# Patient Record
Sex: FEMALE | Race: WHITE | NOT HISPANIC OR LATINO | Employment: OTHER | URBAN - METROPOLITAN AREA
[De-identification: names, ages, dates, MRNs, and addresses within clinical notes are randomized per-mention and may not be internally consistent; named-entity substitution may affect disease eponyms.]

---

## 2023-04-04 ENCOUNTER — PATIENT OUTREACH (OUTPATIENT)
Dept: CARDIOLOGY | Facility: CLINIC | Age: 73
End: 2023-04-04
Payer: COMMERCIAL

## 2023-04-04 ENCOUNTER — HOSPITAL ENCOUNTER (OUTPATIENT)
Facility: CLINIC | Age: 73
End: 2023-04-04
Payer: COMMERCIAL

## 2023-04-04 DIAGNOSIS — I27.20 PULMONARY HYPERTENSION (H): Primary | ICD-10-CM

## 2023-04-04 DIAGNOSIS — I26.09 OTHER PULMONARY EMBOLISM WITH ACUTE COR PULMONALE, UNSPECIFIED CHRONICITY (H): ICD-10-CM

## 2023-04-04 DIAGNOSIS — R06.02 SOB (SHORTNESS OF BREATH): ICD-10-CM

## 2023-04-04 RX ORDER — POTASSIUM CHLORIDE 1500 MG/1
40 TABLET, EXTENDED RELEASE ORAL
Status: CANCELLED | OUTPATIENT
Start: 2023-04-04

## 2023-04-04 RX ORDER — POTASSIUM CHLORIDE 1500 MG/1
20 TABLET, EXTENDED RELEASE ORAL
Status: CANCELLED | OUTPATIENT
Start: 2023-04-04

## 2023-04-04 RX ORDER — LIDOCAINE 40 MG/G
CREAM TOPICAL
Status: CANCELLED | OUTPATIENT
Start: 2023-04-04

## 2023-04-04 RX ORDER — SODIUM CHLORIDE 9 MG/ML
INJECTION, SOLUTION INTRAVENOUS CONTINUOUS
Status: CANCELLED | OUTPATIENT
Start: 2023-04-04

## 2023-04-04 NOTE — PROGRESS NOTES
Patient was seen by Dr. Tejeda at [facility] on 4/4/2023.    Plan:  HRCT  Echo w/bubble  RHC w/vaso & Pulm Angio  Virtual follow up after all testing is completed    Additional Action needed:  Enter Orders    University scheduling needs:   Schedule testing      Plan was reviewed with the patient at time of the visit.  Patient verbalized understanding, agreed with plan and denied any further questions. Sofie Rosen RN on 4/4/2023 at 10:22 AM    -------------------------------------------  All orders placed, routed to [facility] for scheduling. Sofie Rosen RN on 4/4/2023 at 10:44 AM     detailed exam

## 2023-04-18 ENCOUNTER — TELEPHONE (OUTPATIENT)
Dept: CARDIOLOGY | Facility: CLINIC | Age: 73
End: 2023-04-18

## 2023-04-18 NOTE — TELEPHONE ENCOUNTER
Received call from daughter Chloe who said her sister had received a call from someone at the [facility] while she was on vacation telling them we don't accept patient's insurance of [employer] so she can't have testing done here.  patient has seen Dr. Tejeda at outreach clinic in [place] previously.    Clarified that since we are a accredited specialty clinic and there are only 2 in [place] ( & [place]), we do take her insurance but it is billed as an out of network provider so there may be a cost to her.  I recommended she call the insurance and clarify what that will be before we schedule.  She agreed.  --------------------------------------  Daughter called back and was told by insurance that if we can provide a letter of medical necessity of why it can only be done at [facility] and not [facility], it will likely be covered.  This would then mean that [facility] would have to perform the HR Chest CT & Echo w/bubble, but the Pulm Angiogram & RHC should still only be done at [facility]. Agreed we would write the letter for the CV Proc and reach out to our financial securing department to verify it's coverage before it is performed.  Patient verbalized understanding, agreed with plan and denied any further questions. Sofie Rosen RN on 4/18/2023 at 2:40 PM    ----------------------------------------  Sent staff msg to Dr. Tejeda updating him to this development. Sofie Rosen RN on 4/18/2023 at 2:40 PM  --------------------------------------  Dr. Tejeda is OK with patient having Echo & Chest CT at [facility].  I sent staff msg to Rose Mary asking her to compose a letter of medical Necessity for patient's Pulmonary Angiogram & RHC at the [facility]. Sofie Rosen RN on 4/25/2023 at 3:41 PM  -----------------------------------------  Faxed orders for Echo & Chest CT to [facility] and sent e-mail alerting RN's they were sent. Sofie Rosen RN on 4/25/2023 at 3:48 PM

## 2023-04-27 ENCOUNTER — TELEPHONE (OUTPATIENT)
Dept: CARDIOLOGY | Facility: CLINIC | Age: 73
End: 2023-04-27
Payer: COMMERCIAL

## 2023-04-27 NOTE — LETTER
2023      TO: Azul Montiel  [address] [place] [id number]       To Whom It May Concern,    I am writing in reference to a mutual patient, Azul Montiel, :  1950. This letter is to request In-Network authorization and payment for Ms. Montiel to receive appropriate testing, management, and monitoring of her pulmonary hypertension at the [facility].    Azul Montiel's most recent right heart catheterization on 1/3/23 showed severe pulmonary hypertension with midly elevated left-sided filling pressure.  Due to the severity and complexity of Azul's pulmonary arterial hypertension, Pedro Parkinson M.D., has requested that she see Dr. Tejeda at the [facility] for continuatin of care and managin her pulmonary hypertension.         She underwent a very thorough [facility] for the her exertional shortness of breath. Her echocardiogram showed severely dilated right ventricle with moderate to severely reduced right ventricular function. Her interventricular septum was flattened consistent with right ventricular pressure and volume overload. Her estimated right ventricular systolic pressure was significantly elevated at 95 mmHg. Her left ventricle was small and underfilled. She had no other valvular abnormalities. Subsequently she had a right heart catheterization that showed severe pulmonary hypertension with mildly elevated left-sided filling pressure. Her cardiac output was preserved. Her PVR was approximately 4.5 Wood units. Her coronary angiogram showed no significant epicardial coronary artery disease. She had a 50% lesion in the mid circumflex. She had a VQ scan that was intermediate probability with a single mismatch perfusion defect in the left upper lobe. She had a CT pulmonary angiogram that showed no evidence of acute or chronic thromboembolic pulmonary disease. She is now referred to us for further  evaluation and management of her pulmonary hypertension.    Because of Ms. Montiel's complex medical history and need for specialized evaluation for her severe pulmonary hypertension, I would ask that [employer] allow Ms. Montiel to be closely evaluated and monitored at the [facility]. I would request that you allow Ms. Montiel to continue her care at the [facility] with unlimited office visits, diagnoses, treatment and complex testing (such as pulmonary angiogram, right heart catheterization, echocardiogram, cardiac MRI, 6 minute walk test, pulmonary function testing, labs, cardiac stress test, etc.), and that you cover these necessary medical expenses at an in-network rate of reimbursement (100%).     As the [facility] is one of only two accredited Pulmonary Hypertension Care Centers in [place], it would not be appropriate for Ms. Montiel to be evaluated and treated by an outside, non-accredited facility.    If you have further questions or concerns, please contact my Clinical , Rose Mary Storey CMA at [phone].    Graciously,    ?    Nikhil Tejeda MD   of Medicine  Pulmonary Hypertension  [facility]  Cardiovascular Division

## 2023-04-27 NOTE — LETTER
Cardiology/Pulmonary Hypertension  [address]  [place] [address]  Phone [phone]  Fax [phone]  May 3, 2023    [employer]       RE:   Azul Montiel [address] [address]        :  1950  [place]  [address]      ID:   [id number]   FAX:  [phone]    To Whom It May Concern,    I am writing in reference to a mutual patient, Azul Montiel, :  1950. This letter is to request In-Network authorization and payment for Ms. Montiel to receive appropriate testing, management, and monitoring of her pulmonary hypertension at the [facility].    Ms. Montiel was referred to [facility] by Dr. Pedro Parkinson, Cardiology/Interventional Cardiology at .  Ms. Montiel underwent extensive testing for her exertional shortness of breath.   Ms. Montiel's most recent right heart catheterization on 1/3/23 showed severe pulmonary hypertension with midly elevated left-sided filling pressure.  Due to the severity and complexity of Azul's pulmonary arterial hypertension, Dr. Parkinson has requested that she see Dr. Tejeda at the [facility] for continuation of care and management of her pulmonary hypertension, Who Group 3, NYHA Functional Class 3.       Because of Ms. Montiel's complex medical history and need for specialized evaluation for her severe pulmonary hypertension, I would ask that [facility] allow Ms. Montiel to be closely evaluated and monitored at the [facility]. I would request that you allow Ms. Montiel to continue her care at the [facility] with unlimited office visits, diagnoses, treatment and complex testing (such as pulmonary angiogram, right heart catheterization, echocardiogram, cardiac MRI, 6 minute walk test, pulmonary function testing, labs, cardiac stress test, etc.), and that you cover these necessary medical expenses at an in-network rate of reimbursement (100%).      As the [facility] is one of only two accredited Pulmonary Hypertension Care Centers in [place], it would not be appropriate for Ms. Montiel to be evaluated and treated by an outside, non-accredited facility.    If you have further questions or concerns, please contact my Clinical , Rose Mary Storey CMA at [phone].    Graciously,    ?Nikhil Tejeda MD   of Medicine, Pulmonary Hypertension  [facility], Cardiovascular Division

## 2023-04-27 NOTE — LETTER
May 3, 2023    [facility]       RE:   Azul MontielMontserrat [address] [address]       :  1950  [place]  [address]      ID:   [id number]     FAX:  [phone]    To Whom It May Concern,    I am writing in reference to a mutual patient, Azul Montiel, :  1950. This letter is to request In-Network authorization and payment for Ms. Montiel to receive appropriate testing, management, and monitoring of her pulmonary hypertension at the [facility].    Ms. Montiel was referred to [facility] by Dr. Pedro Parkinson, Cardiology/Interventional Cardiology at [facility].  Ms. Montiel underwent extensive testing for her exertional shortness of breath.   Ms. Montiel's most recent right heart catheterization on 1/3/23 showed severe pulmonary hypertension with midly elevated left-sided filling pressure.  Due to the severity and complexity of Azul's pulmonary arterial hypertension, Dr. Parkinson has requested that she see Dr. Tejeda at the [facility] for continuation of care and management of her pulmonary hypertension, Who Group 3, NYHA Functional Class 3.       Because of Ms. Montiel's complex medical history and need for specialized evaluation for her severe pulmonary hypertension, I would ask that [facility] allow Ms. Montiel to be closely evaluated and monitored at the [facility]. I would request that you allow Ms. Montiel to continue her care at the [facility] with unlimited office visits, diagnoses, treatment and complex testing (such as pulmonary angiogram, right heart catheterization, echocardiogram, cardiac MRI, 6 minute walk test, pulmonary function testing, labs, cardiac stress test, etc.), and that you cover these necessary medical expenses at an in-network rate of reimbursement (100%).     As the [facility] is one of only two accredited Pulmonary Hypertension Care Centers in [place], it would not  be appropriate for Ms. Montiel to be evaluated and treated by an outside, non-accredited facility.    If you have further questions or concerns, please contact my Clinical , Rose Mary Storey CMA at [phone].    Graciously,    ?    Nikhil Tejeda MD   of Medicine  Pulmonary Hypertension  [facility]  Cardiovascular Division

## 2023-04-27 NOTE — TELEPHONE ENCOUNTER
----- Message from Sofie Rosen RN sent at 4/25/2023  3:38 PM CDT -----  Regarding: Letter  Rose Mary,    Patient needs a (Medical necessity) letter written to her insurance so they will cover a Pulmonary Angiogram & RHC at the [facility] as we are out of network.      Thanks,  Sofie

## 2023-05-03 NOTE — TELEPHONE ENCOUNTER
"5/3/2023  @ 9:37 AM - Sent msg to HEAVENLY Sullivan re: updating WHO group, Abril; \"Oh Carrizales, For Ltr of Med Nec:   Pt's Care Team is not updated, and I will need name of her referring PCP.  Also,  has she had any testing done that is necessitating the PH referral that I can reference in the letter to [facility]?  I can't see any notes from her visit on 4/4/23, and no recs from [facility]/scanned to her chart/from CareEverywhere.  Were any records requested prior to her appt on 4/4/23?\"      940 am -  LRR called [employer] @ [phone] - for address or fax # to send Letter of Medical Necessity/ Request for Out of Network coverage.  Kimberlyn/Rep -   Ref #:  [id number]- stated that there is no fax #, that the request needs to be completed via [email or web address]. \     LRR stated that we do not have access to [employer] and req to speak to supervisor.  942 am -  Spoke to supervisor:  Butch.   Send to : [employer], [address] [address], [place] [address].  FAX:  [phone].         Rose Mary GALINDO CMA- Prior Auths  Cardiology/Pulmonary Hypertension     "

## 2023-05-30 ENCOUNTER — TELEPHONE (OUTPATIENT)
Dept: CARDIOLOGY | Facility: CLINIC | Age: 73
End: 2023-05-30
Payer: COMMERCIAL

## 2023-05-30 NOTE — TELEPHONE ENCOUNTER
Daughter called to say that they haven't heard anything from anyone for about a month.  Her mother completed the 2 tests at [facility] like we asked, but they haven't heard from patient's insurance about approving the CV procedure we were trying to get  Done after writing them a letter.    After looking though the chart, the Insurance letter was written 5/3, but it doesn't appear to have been sent to patient's insurance.  A follow up appt was overlooked because we were waiting for the procedure to be completed before having one.  Advised daughter I will look at testing in [facility] and follow up with our financial securing to see how to go about sending this letter as part of an appeal then zack her back. Daughter verbalized understanding, agreed with plan and denied any further questions. Sofie Rosen RN on 5/30/2023 at 10:45 AM  ---------------------------------  Sent staff msg to Indu Dean in Financial Securing to see if she can help re-submit CV procedure approval with letter.  Sofie Rosen RN on 5/30/2023 at 11:00 AM    Indu said she couldn't see anything on her end, so recommended I call [employer] to see if they could help.    Billing advised I should just send the letter directly to patient's insurance company as she doesn't see any denial on record.  ----------------------------------  Faxed Letter of Medical Necessity directly to [facility] [employer] via Fax.  Sofie Rosen RN on 5/30/2023 at 3:28 PM     Detail Level: Detailed Otc Regimen: Apply aquaphor multiple times daily

## 2023-05-30 NOTE — TELEPHONE ENCOUNTER
Reviewed Echo w/bbble & Chest CT performed at [facility].Sofie Rosen RN on 5/30/2023 at 3:36 PM  -------------------------------  Called daughter & updated her that I reviewed the Echo & Chest CT as well as faxed the letter to [facility] directly.  Asked that they call me when they hear something from ins company. Daughter verbalized understanding, agreed with plan and denied any further questions. Sofie Rosen RN on 5/30/2023 at 3:45 PM

## 2023-06-12 ENCOUNTER — TELEPHONE (OUTPATIENT)
Dept: CARDIOLOGY | Facility: CLINIC | Age: 73
End: 2023-06-12
Payer: COMMERCIAL

## 2023-06-12 DIAGNOSIS — R06.02 SOB (SHORTNESS OF BREATH): ICD-10-CM

## 2023-06-12 DIAGNOSIS — I27.20 PULMONARY HYPERTENSION (H): Primary | ICD-10-CM

## 2023-06-12 RX ORDER — TREPROSTINIL 16-32-48
KIT INHALATION
Qty: 120 EACH | Refills: 11 | COMMUNITY
Start: 2023-06-12 | End: 2023-07-10

## 2023-06-12 RX ORDER — ROSUVASTATIN CALCIUM 20 MG/1
1 TABLET, COATED ORAL DAILY
COMMUNITY
Start: 2023-01-03

## 2023-06-12 RX ORDER — CITALOPRAM HYDROBROMIDE 40 MG/1
1 TABLET ORAL DAILY
COMMUNITY
Start: 2023-02-21

## 2023-06-12 RX ORDER — IPRATROPIUM BROMIDE AND ALBUTEROL SULFATE 2.5; .5 MG/3ML; MG/3ML
3 SOLUTION RESPIRATORY (INHALATION)
COMMUNITY
Start: 2022-08-12

## 2023-06-12 RX ORDER — PHENYTOIN SODIUM 100 MG/1
CAPSULE, EXTENDED RELEASE ORAL
COMMUNITY
Start: 2023-01-17

## 2023-06-12 RX ORDER — FUROSEMIDE 20 MG
1 TABLET ORAL DAILY
COMMUNITY
Start: 2022-12-13

## 2023-06-12 RX ORDER — GLIMEPIRIDE 4 MG/1
4 TABLET ORAL
COMMUNITY
Start: 2023-03-17

## 2023-06-12 RX ORDER — RAMIPRIL 2.5 MG/1
CAPSULE ORAL
COMMUNITY
Start: 2023-04-17

## 2023-06-12 RX ORDER — BUPROPION HYDROCHLORIDE 300 MG/1
300 TABLET ORAL
COMMUNITY
Start: 2022-10-16

## 2023-06-12 NOTE — TELEPHONE ENCOUNTER
"6/14/2023  @ 4:57 PM -      1)  Spoke to daughter Chloe who stated that as of 6/13/23, [employer] is stating that they had not received the appeal/in-network authorization request.   JOSE refaxed to [employer] @  [phone] requesting expedited response to our fax #.     2) Daughter Chloe was wondering about scheduling Azul's F/U appt after she starts Tyvaso DPI  - she was told by \"scheduling\" that Dr Tejeda did not have any openings until October.   JOSE told her that it is possible for the F/U appt to be virtual but would check with  Nursing/Sofie to verify.   Hopefully by then [employer]ori will have address the in-network authorization request.      3)  Chloe will cxl pt' s appt with her Cardiologist in [place], [place] that is scheduled for 7/3/23 as there is nothing new to address with that physician.   Rose Mary GALINDO [facility]- Prior Auths  Cardiology/Pulmonary Hypertension       Called daughter Chloe who has not heard from Insurance about the procedure, but did speak with Dr. Tejeda last week and a plan was made to start Tyvaso & follow up in July @ [facility].  Verbalized understanding. Sofie Rosen RN on 6/12/2023 at 2:59 PM      ----- Message from Sofie Rosen RN sent at 5/30/2023  3:40 PM CDT -----  Regarding: FW: Appeal  Faxed letter of medical Necessity 5/30/23 for Pulm Angio.  Reply??  ----- Message -----  From: Indu Daen  Sent: 5/30/2023  12:07 PM CDT  To: Sofie Rosen RN  Subject: RE: Appeal                                       Hello,    It looks like the patient has [employer] coverage which we do not have a contract with so if the patient can have this done at a [facility] facility she should have her care there. I tried to look and see if I could find any notes from anyone working on an appeal but I do not see anything in her chart or at least what I can access I don't see anything. We do not have contact info for the appeals department. Maybe try calling the billing department at [phone] and " ask if you could speak to someone on the appeals team. I would think that they would be able to get you into contact with someone.    Thank you,  Indu    ----- Message -----  From: Sofie Rosen RN  Sent: 5/30/2023  10:52 AM CDT  To: Indu Dean  Subject: Appeal                                           Hi Indu,    The above patient was supposed to have a Pulmonary Angiogram and RHC completed at the [place] [facility] a couple months ago.  It was denied so we wrote a letter of medical necessity but I don't think anyone has sent it to her insurance yet to try and get it approved.  Are you the correct person to help me with this?    I appreciate any help you can give.    Thanks,  Sofie

## 2023-06-14 ENCOUNTER — TELEPHONE (OUTPATIENT)
Dept: CARDIOLOGY | Facility: CLINIC | Age: 73
End: 2023-06-14
Payer: COMMERCIAL

## 2023-06-14 DIAGNOSIS — I27.20 PULMONARY HYPERTENSION (H): ICD-10-CM

## 2023-06-14 DIAGNOSIS — R06.02 SOB (SHORTNESS OF BREATH): ICD-10-CM

## 2023-06-14 NOTE — TELEPHONE ENCOUNTER
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 Follow up in a month after starting tyvaso in [place]. I have updated my note in  for her.     Thank you    TT      ----- Message -----  From: Sofie Rosen RN  Sent: 6/13/2023   1:54 PM CDT  To: Nikhil Tejeda MD  Subject: Plan                                             Hi Dr. Tejeda,    I saw a note come through somewhere about this patient and her starting Tyvaso DPI, but I can't find the original message and what the rest of the plan is.  Can you remind me please.    Sofie Moe

## 2023-06-14 NOTE — TELEPHONE ENCOUNTER
6/14/2023  @ 4:10 PM -  Tyvaso DPI -titr kit - new start - APPROVED -  PA Case: [id number], Status: Approved, Coverage Starts on: 1/1/2023 12:00:00 AM, Coverage Ends on: 12/31/2023 12:00:00 AM. Questions? Contact [phone].    Prior Authorization Approval  Medication: TYVASO DPI TITRATION KIT 16 & 32 & 48 MCG IN POWD  Authorization Effective Date: 1/1/2023  Authorization Expiration Date: 12/31/2023  Approved Dose/Quantity: 252/28  Reference #: Case: [id number]   Insurance Company: [employer] [phone] Fax [phone]  Expected CoPay:       CoPay Card Available:      Financial Assistance Needed: *  Which Pharmacy is filling the prescription: [facility] [address]  Pharmacy Notified:  See below  Patient Notified:   Called pt @ [phone] (home) [phone] (work) - no answer, LVM that PA was approved, enrollment sent to [employer] and copay assist submitted to [employer]    Updated Snapshot, added to PA Calendar; enrollment and PA approval info and note that Req for Support was faxed to [employer] included on cover sheet and faxed to : [employer]:  [phone]    Rose Mary GALINDO CMA- Prior Auths  Cardiology/Pulmonary Hypertension

## 2023-06-14 NOTE — TELEPHONE ENCOUNTER
"7/12/2023  @ 5:10 PM -      1)  Chloe daughter calling that [employer] insurance is requiring provider's office to call and talk to clinical dept at [facility] to verify that pt has the disease.  [facility] staff stated they do not have a pending PA on file.   [A PA was submitted to [employer] on 6/28/23 - no response]  Chloe is wanting to know if the office visit/virtual scheduled for 7/27/23 will be covered by [employer].       LRR let Chloe know:    Per [facility] [employer] Payor policy, pt would be covered due to exception : \" Pulmonary Hypertension Program: Patients in this program at the [facility] are allowed to be scheduled/seen due to limited availability of these services in [place]. Make patient aware that [facility] is a non-participating provider to [facility]\"       LRR gave Chloe phone # for Cost of Care:  [phone] and Financial Securing to see if they have information on whether next visit could be charged as in-network instead of OON.  She will call [facility] back if there is anything needed.       2)  Chloe reported that pt rcvd Tyvaso DPI on Wednesday, 7/5/23.  They waited 2-3 days for [facility] nurse to call and no one called.  Sofie called pt, who reported she has not taken it yet.   Sofie yesterday reached out via email why Nurse has not reached out.   Chloe also called [facility] again,  was on hold for 40 minutes, escalated to manager, manager was not able to answer question either.    [facility] sent message to Saundra/[facility] follow up and give status update.        Routed to  Nursing Pool.       Rose Mary GALINDO CMA- Prior Auths  Cardiology/Pulmonary Hypertension     6/20/2023  @ 11:16 AM -  Spoke to daughter, Chloe and let her know I would send the Req for Support via [employer] for Azul to sign and send back and I will fax to [employer] on her behalf.   Chloe verbalized understanding.   Assist has faxed back confirmation that they received Provider portion 6/14/23 and will reach out to patient for signature.       Chloe also mentioned appeal to [facility] for " OON coverage for pt at [facility].  Appeal letter was refaxed on 6/14/23 and LRR  Has not seen a response as of today.    Asked Chloe to call [employer] and check if they are received appeal and if there is a decision made.   Chloe EASON.    Rose Mary GALINDO CMA- Prior Auths  Cardiology/Pulmonary Hypertension       6/14/2023  @ 3:20 PM -  Enrollment - enrollment and PA approval info and note that Req for Support was faxed to [employer]asia included on cover sheet and faxed to : [facility]:  [phone]              6/21/2023  @ 11:15 AM -  Rcvd from pt via WHATT 6/20/23 and faxed to Assist via [email or web address]. lrr  Rose Mary GALINDO CMA- Prior Auths  Cardiology/Pulmonary Hypertension         Rose Mary GALINDO CMA- Prior Auths  Cardiology/Pulmonary Hypertension

## 2023-06-14 NOTE — TELEPHONE ENCOUNTER
6/14/2023  @ 3:21 PM -  Tyvaso DPI - titra kit - new start     PA Initiation  Medication: Tyvaso DPI - titra kit - new start   Insurance Company: [employer] - Phone [phone] Fax [phone]  Pharmacy Filling the Rx: [facility] [place] - [address] [place] [address]  Filling Pharmacy Phone:    Filling Pharmacy Fax:    Start Date: 6/14/2023  via [facility], key [id number], w/ RHC dated : 1/3/23; Dx Code: I27.2 OOP to ILD; Addtl responses:  Azul Montiel's wedge pressure was elevated at the time of the right heart catheterization to 18 as she was in a state of hypervolemia, causing a decrease in her PVR.    RHC (01/2023)  [facility]  RA 6  RV   PA 73/26 (43)  PCWP 18  LVEDP 19 mm Hg  PA% 63  AO%   CO/CI 5.8/2.6  PVR 4.3 RODRIGUEZ       ----------------------------  Insurance: [employer] MEDICARE/DST PHARM SOL DIRECT  BIN: [id number]  PCN: [id number]  GRP#: *  ID#: [id number]       Rose Mary GALINDO CMA- Prior Auths  Cardiology/Pulmonary Hypertension

## 2023-06-14 NOTE — TELEPHONE ENCOUNTER
Sent message to Rose Mary to begin PA for Tyvaso DPI. Sofie Rosen RN on 6/14/2023 at 10:29 AM    Sent e-mail to [place] RN's with update of plan and requested patient be scheduled for follow up appt in July/Aug with labs & 6mwt prior.  I placed orders directly into [facility] chart. Sofie Rosen RN on 6/14/2023 at 10:51 AM    ----- Message from Nikhil Tejeda MD sent at 6/13/2023  9:50 PM CDT -----  Regarding: RE: Plan  We are starting her on inhaled tyvaso DPI for PH-ILD. Follow up in a month after starting tyvaso in [place]. I have updated my note in [facility] for her.     Thank you    TT      ----- Message -----  From: Sofie Rosen RN  Sent: 6/13/2023   1:54 PM CDT  To: Nikhil Tejeda MD  Subject: Plan                                             Hi Dr. Tejeda,    I saw a note come through somewhere about this patient and her starting Tyvaso DPI, but I can't find the original message and what the rest of the plan is.  Can you remind me please.    Thanks,  Sofie

## 2023-06-20 ENCOUNTER — TELEPHONE (OUTPATIENT)
Dept: CARDIOLOGY | Facility: CLINIC | Age: 73
End: 2023-06-20
Payer: COMMERCIAL

## 2023-06-20 NOTE — TELEPHONE ENCOUNTER
6/27/2023  @ 12:39 PM -  Rcvd fax back from [facility] requesting additional information and for request to be submitted via [employer].   LRR forwarded form to Indu Dean/ Securing requesting she complete and submit as I do not have the CPT/ procedure codes needed on the form.             Rose Mary GALINDO CMA- Prior Auths  Cardiology/Pulmonary Hypertension       6/20/2023  @ 11:16 AM -  Spoke to daughter, Chloe and let her know I would send the Req for Support via [employer] for Azul to sign and send back and I will fax to Assist on her behalf.   Chloe verbalized understanding.   Assist has faxed back confirmation that they received Provider portion 6/14/23 and will reach out to patient for signature.       Chloe also mentioned appeal to [employer] for OON coverage for pt at [facility].  Appeal letter was refaxed on 6/14/23 and LRR  Has not seen a response as of today.    Asked Chloe to call [employer] and check if they are received appeal and if there is a decision made.   Chloe EASON.    Rose Mary GALINDO CMA- Prior [facility]  Cardiology/Pulmonary Hypertension

## 2023-06-23 NOTE — TELEPHONE ENCOUNTER
Received call from [facility] [employer] [facility] stating she received our letter and wants to confirm what we are looking for with it.    Advised her that we need 2 Authorizations;  1) Office visit  2) Pulmonary Angiogram    She will forward to their clinical team so they know we are looking for two authorizations.  Agreed with plan. Sofie Rosen RN on 6/23/2023 at 9:02 AM

## 2023-06-26 NOTE — TELEPHONE ENCOUNTER
"6/26/2023  @ 2:04 PM -  Per status report dated : 6/23/23 from [facility]:  \"RX ELECTRONICALLY SIGNED, NOT VALID. PLEASE HAVE PRESCRIBER SIGN AND DATE THE ATTACHED PRESCRIPTION AND RETURN VIA -[phone]\"  Rose Mary GALINDO CMA- Prior Auths  Cardiology/Pulmonary Hypertension     "

## 2023-06-28 NOTE — TELEPHONE ENCOUNTER
"7/12/2023  @ 5:10 PM -       1)  Chloe daughter calling that [employer] insurance is requiring provider's office to call and talk to clinical dept at [facility] to verify that pt has the disease.  [facility] staff stated they do not have a pending PA on file.   [A PA was submitted to [employer] on 6/28/23 - no response]  Chloe is wanting to know if the office visit/virtual scheduled for 7/27/23 will be covered by [employer].       LRR let Chloe know:    Per [facility] [employer] Payor policy, pt would be covered due to exception : \" Pulmonary Hypertension Program: Patients in this program at the [facility] are allowed to be scheduled/seen due to limited availability of these services in [place]. Make patient aware that [facility] is a non-participating provider to [facility]\"       LRR gave Chloe phone # for Cost of Care:  [phone] and Financial Securing to see if they have information on whether next visit could be charged as in-network instead of OON.  She will call [facility] back if there is anything needed.        2)  Chloe reported that pt rcvd Tyvaso DPI on Wednesday, 7/5/23.  They waited 2-3 days for [facility] nurse to call and no one called.  Sofie called pt, who reported she has not taken it yet.   Sofie yesterday reached out via email why Nurse has not reached out.   Chloe also called [facility] again,  was on hold for 40 minutes, escalated to manager, manager was not able to answer question either.    LRR sent message to [facility] follow up and give status update.         Routed to  Nursing Pool.        Rose Mary GALINDO CMA- Prior Auths  Cardiology/Pulmonary Hypertension        6/28/2023  @ 2:59 PM -  Per [facility] [employer] Pay[employer] policy, pt would be covered due to exception : \" Pulmonary Hypertension Program: Patients in this program at the [facility] are allowed to be scheduled/seen due to limited availability of these services in [place]. Make patient aware that [facility] is a non-participating provider to [facility]\"     Sent fax to [facility] with LMN and " OON form via [employer] to :  [phone] and a copy to pt/daughter via [employer].            Rose Mary GALINDO CMA- Prior Auths  Cardiology/Pulmonary Hypertension

## 2023-06-30 NOTE — TELEPHONE ENCOUNTER
FREE DRUG APPLICATION APPROVED    Medication: TYVASO DPI TITRATION KIT 16 & 32 & 48 MCG IN POWD  Program Name: [id number] - APPROVED- 6/30/23 - lrr  Effective Date: 6/30/2023  Expiration Date: 6/30/2024  Pharmacy Filling the Rx: [facility] [address] [place] [address]  Patient Notified: Y  Additional Information: *    6/30/2023  @ 8:51 AM -  [employer] Prov Attestation -       Rose Mary GALINDO CMA- Prior Auths  Cardiology/Pulmonary Hypertension

## 2023-07-05 NOTE — TELEPHONE ENCOUNTER
"7/5/2023  @ 10:49 AM -  Per status report dated : 6/30/23 from [facility]:  \"RX ELECTRONICALLY SIGNED, NOT VALID. PLEASE HAVE PRESCRIBER SIGN AND DATE THE ATTACHED PRESCRIPTION AND RETURN VIA -[phone]\"     Routed to  Nursing :  HEAVENLY Britt, [facility]- Prior Auths  Cardiology/Pulmonary Hypertension     "

## 2023-07-10 RX ORDER — TREPROSTINIL 16-32-48
16 KIT INHALATION 4 TIMES DAILY
Qty: 120 EACH | Refills: 11 | Status: SHIPPED | OUTPATIENT
Start: 2023-07-10 | End: 2023-11-07 | Stop reason: DRUGHIGH

## 2023-07-12 NOTE — TELEPHONE ENCOUNTER
"7/12/2023  @ 5:10 PM -       1)  Chloe daughter calling that [employer] insurance is requiring provider's office to call and talk to clinical dept at [facility] to verify that pt has the disease.  [facility] staff stated they do not have a pending PA on file.   [A PA was submitted to [employer] on 6/28/23 - no response]  Chloe is wanting to know if the office visit/virtual scheduled for 7/27/23 will be covered by [employer].       LRR let Chloe know:    Per [facility] [employer] Payor policy, pt would be covered due to exception : \" Pulmonary Hypertension Program: Patients in this program at the [facility] are allowed to be scheduled/seen due to limited availability of these services in [place]. Make patient aware that [facility] is a non-participating provider to [employer]\"       LRR gave Chloe phone # for Cost of Care:  [phone] and Financial Securing to see if they have information on whether next visit could be charged as in-network instead of OON.  She will call [facility] back if there is anything needed.        2)  Chloe reported that pt rcvd Tyvaso DPI on Wednesday, 7/5/23.  They waited 2-3 days for [facility] nurse to call and no one called.  Sofie called pt, who reported she has not taken it yet.   Sofie yesterday reached out via email why Nurse has not reached out.   Chloe also called [facility] again,  was on hold for 40 minutes, escalated to manager, manager was not able to answer question either.    LRR sent message to [facility] follow up and give status update.         Routed to  Nursing Pool.        Rose Mary GALINDO CMA- Prior Auths  Cardiology/Pulmonary Hypertension   "

## 2023-07-14 NOTE — TELEPHONE ENCOUNTER
"7/14/2023  @ 10:57 AM -  Per email today from [facility]:  \"This has been cleared and the patient is scheduled for start of care with nursing on Monday, 7/17.\"  Rose Mary GALINDO CMA- Prior Auths  Cardiology/Pulmonary Hypertension     "

## 2023-07-19 NOTE — TELEPHONE ENCOUNTER
Patient started on 7/17/23 no issues so far. Patient will call in interim for concerns. Has a follow-up 9/4 in [place] with Dr. Nikhil Alexis RN on 7/19/2023 at 8:53 AM

## 2023-08-13 ENCOUNTER — HEALTH MAINTENANCE LETTER (OUTPATIENT)
Age: 73
End: 2023-08-13

## 2023-09-05 ENCOUNTER — PATIENT OUTREACH (OUTPATIENT)
Dept: CARDIOLOGY | Facility: CLINIC | Age: 73
End: 2023-09-05
Payer: COMMERCIAL

## 2023-09-05 DIAGNOSIS — J84.9 ILD (INTERSTITIAL LUNG DISEASE) (H): Primary | ICD-10-CM

## 2023-09-05 NOTE — PROGRESS NOTES
Pt was seen in [place] for follow up with Dr. Tejeda on 9/5/2023.    Plan:  - 6MWT with O2 titration today  - Pulmonary referral for ILD  - March follow up with labs and 6MWT    I placed all orders and reviewed all testing with the pt.     Additional Action Needed:  - 6 MWT with 02 titration completed in clinic. Please send O2 Rx for 10-15L with exertion  - Fax ILD referral to local pulmonologist     [place] Testing and Appointment Schedule:  - N/A. Haseeb Matta RN to follow up with [employer] insurance and why patient cannot be seen at the

## 2023-09-06 NOTE — PROGRESS NOTES
Ordered Pulm Referral through [employer] system, as well as sending staff msg to Pulm Scheduling per order instructions. Sofie Rosen RN on 9/6/2023 at 4:23 PM  -----------------------------  Verified follow up orders were placed in [employer] system. Sofie Rosen RN on 9/8/2023 at 1:14 PM  -----------------------------  Re-faxed O2 orders to [facility]. Sofie Rosen RN on 9/19/2023 at 3:39 PM

## 2023-09-08 ENCOUNTER — TELEPHONE (OUTPATIENT)
Dept: CARDIOLOGY | Facility: CLINIC | Age: 73
End: 2023-09-08
Payer: COMMERCIAL

## 2023-09-08 DIAGNOSIS — R06.02 SOB (SHORTNESS OF BREATH): ICD-10-CM

## 2023-09-08 DIAGNOSIS — I26.09 OTHER PULMONARY EMBOLISM WITH ACUTE COR PULMONALE, UNSPECIFIED CHRONICITY (H): ICD-10-CM

## 2023-09-08 DIAGNOSIS — I27.20 PULMONARY HYPERTENSION (H): Primary | ICD-10-CM

## 2023-09-08 RX ORDER — SODIUM CHLORIDE 9 MG/ML
INJECTION, SOLUTION INTRAVENOUS CONTINUOUS
Status: CANCELLED | OUTPATIENT
Start: 2023-09-08

## 2023-09-08 RX ORDER — LIDOCAINE 40 MG/G
CREAM TOPICAL
Status: CANCELLED | OUTPATIENT
Start: 2023-09-08

## 2023-09-08 RX ORDER — POTASSIUM CHLORIDE 1500 MG/1
20 TABLET, EXTENDED RELEASE ORAL
Status: CANCELLED | OUTPATIENT
Start: 2023-09-08

## 2023-09-08 RX ORDER — POTASSIUM CHLORIDE 1500 MG/1
40 TABLET, EXTENDED RELEASE ORAL
Status: CANCELLED | OUTPATIENT
Start: 2023-09-08

## 2023-09-08 NOTE — TELEPHONE ENCOUNTER
Called benefits department with [employer]; reference number [id number]. Was advised that patient has $4200 in-network deductible. Inquired about copay with previous virtual visit with Dian Cuevas. Was advised that both diagnostic testing and office visits would be $45 OOP. Asked if  pulmonary angiogram/RHC would be covered; representative unable to answer and advised procedure would need to be scheduled first. Myla Matta RN on 9/7/2023 at 12:25 PM    Per Dr. Tejeda, if patient is able to be seen at the , schedule for RHC/pulm angio with Dr. Perez in October. Orders placed and staff message sent to Selina to schedule. Will follow up with [employer] insurance once PA is approved. Myla Matta RN on 9/8/2023 at 12:30 PM    Called daughter to explain the situation; advised Selina will be calling to coordinate RHC/pulm angio for October. Advised I would then follow up with [employer] to ensure it is covered. Myla Matta RN on 9/8/2023 at 12:39 PM

## 2023-09-11 ENCOUNTER — TELEPHONE (OUTPATIENT)
Dept: CARDIOLOGY | Facility: CLINIC | Age: 73
End: 2023-09-11
Payer: COMMERCIAL

## 2023-09-11 NOTE — TELEPHONE ENCOUNTER
Cath Lab Case Request/Order    Location: [address] [address] [facility] Waiting Room    Procedure: Pulmonary Angio/RHC    Procedure Date:  10/16    Patient Arrival Time:  1030    Procedure Time: 2nd case to follow    Ordering Provider: Dr. Nikhil Tejeda    Performing Cardiologist: Dr. Travis Aldrich    Inpatient Bed Needed: No    Post-  Procedure ANILA appointment scheduled (1 - 2 weeks): YES     Date:  10/23     Provider: Nikhil    Communicated Patient Instructions:     NURSE WILL GO OVER ALL PRE PROCEDURE INSTRUCTIONS TO PATIENT     Appointment was scheduled: Over the phone    Patient expressed understanding of above instructions and denied further questions at this time.    Selina Wiggins

## 2023-09-18 ENCOUNTER — MYC MEDICAL ADVICE (OUTPATIENT)
Dept: CARDIOLOGY | Facility: CLINIC | Age: 73
End: 2023-09-18
Payer: COMMERCIAL

## 2023-09-19 NOTE — TELEPHONE ENCOUNTER
Re-faxed O2 orders to [facility] to exchange out 5L concentrator for 10L. Sofie Rosen RN on 9/19/2023 at 2:52 PM  Fax number: [phone]

## 2023-10-10 ENCOUNTER — MYC MEDICAL ADVICE (OUTPATIENT)
Dept: CARDIOLOGY | Facility: CLINIC | Age: 73
End: 2023-10-10
Payer: COMMERCIAL

## 2023-10-16 ENCOUNTER — HOSPITAL ENCOUNTER (OUTPATIENT)
Facility: CLINIC | Age: 73
Discharge: HOME | End: 2023-10-16
Attending: INTERNAL MEDICINE | Admitting: INTERNAL MEDICINE
Payer: COMMERCIAL

## 2023-10-16 ENCOUNTER — APPOINTMENT (OUTPATIENT)
Dept: MEDSURG UNIT | Facility: CLINIC | Age: 73
End: 2023-10-16
Attending: INTERNAL MEDICINE
Payer: COMMERCIAL

## 2023-10-16 ENCOUNTER — APPOINTMENT (OUTPATIENT)
Dept: LAB | Facility: CLINIC | Age: 73
End: 2023-10-16
Attending: INTERNAL MEDICINE
Payer: COMMERCIAL

## 2023-10-16 ENCOUNTER — ALLIED HEALTH/NURSE VISIT (OUTPATIENT)
Dept: RESEARCH | Facility: CLINIC | Age: 73
End: 2023-10-16

## 2023-10-16 VITALS
RESPIRATION RATE: 16 BRPM | SYSTOLIC BLOOD PRESSURE: 122 MMHG | OXYGEN SATURATION: 95 % | BODY MASS INDEX: 48.26 KG/M2 | HEIGHT: 65 IN | DIASTOLIC BLOOD PRESSURE: 61 MMHG | TEMPERATURE: 98.5 F | WEIGHT: 289.68 LBS | HEART RATE: 74 BPM

## 2023-10-16 DIAGNOSIS — I26.09 OTHER PULMONARY EMBOLISM WITH ACUTE COR PULMONALE, UNSPECIFIED CHRONICITY (H): ICD-10-CM

## 2023-10-16 DIAGNOSIS — R06.02 SOB (SHORTNESS OF BREATH): ICD-10-CM

## 2023-10-16 DIAGNOSIS — Z00.6 RESEARCH STUDY PATIENT: Primary | ICD-10-CM

## 2023-10-16 DIAGNOSIS — I27.20 PULMONARY HYPERTENSION (H): ICD-10-CM

## 2023-10-16 LAB
ALBUMIN SERPL BCG-MCNC: 3.7 G/DL (ref 3.5–5.2)
ALP SERPL-CCNC: 117 U/L (ref 35–104)
ALT SERPL W P-5'-P-CCNC: 12 U/L (ref 0–50)
ANION GAP SERPL CALCULATED.3IONS-SCNC: 11 MMOL/L (ref 7–15)
APTT PPP: 30 SECONDS (ref 22–38)
AST SERPL W P-5'-P-CCNC: 24 U/L (ref 0–45)
BILIRUB SERPL-MCNC: 0.3 MG/DL
BUN SERPL-MCNC: 19.9 MG/DL (ref 8–23)
CALCIUM SERPL-MCNC: 8.9 MG/DL (ref 8.8–10.2)
CHLORIDE SERPL-SCNC: 100 MMOL/L (ref 98–107)
CREAT SERPL-MCNC: 0.97 MG/DL (ref 0.51–0.95)
DEPRECATED HCO3 PLAS-SCNC: 26 MMOL/L (ref 22–29)
EGFRCR SERPLBLD CKD-EPI 2021: 62 ML/MIN/1.73M2
ERYTHROCYTE [DISTWIDTH] IN BLOOD BY AUTOMATED COUNT: 13.2 % (ref 10–15)
GLUCOSE SERPL-MCNC: 144 MG/DL (ref 70–99)
HCT VFR BLD AUTO: 41 % (ref 35–47)
HGB BLD-MCNC: 12.3 G/DL (ref 11.7–15.7)
HGB BLD-MCNC: 12.9 G/DL (ref 11.7–15.7)
INR PPP: 1.07 (ref 0.85–1.15)
MCH RBC QN AUTO: 30.4 PG (ref 26.5–33)
MCHC RBC AUTO-ENTMCNC: 31.5 G/DL (ref 31.5–36.5)
MCV RBC AUTO: 97 FL (ref 78–100)
NT-PROBNP SERPL-MCNC: 234 PG/ML (ref 0–900)
OXYHGB MFR BLDV: 65 % (ref 92–100)
PLATELET # BLD AUTO: 149 10E3/UL (ref 150–450)
POTASSIUM SERPL-SCNC: 4.9 MMOL/L (ref 3.4–5.3)
POTASSIUM SERPL-SCNC: 5.6 MMOL/L (ref 3.4–5.3)
PROT SERPL-MCNC: 8.4 G/DL (ref 6.4–8.3)
RBC # BLD AUTO: 4.24 10E6/UL (ref 3.8–5.2)
SODIUM SERPL-SCNC: 137 MMOL/L (ref 135–145)
WBC # BLD AUTO: 8.4 10E3/UL (ref 4–11)

## 2023-10-16 PROCEDURE — C1751 CATH, INF, PER/CENT/MIDLINE: HCPCS | Performed by: INTERNAL MEDICINE

## 2023-10-16 PROCEDURE — 999N000142 HC STATISTIC PROCEDURE PREP ONLY

## 2023-10-16 PROCEDURE — 250N000009 HC RX 250: Performed by: INTERNAL MEDICINE

## 2023-10-16 PROCEDURE — 85730 THROMBOPLASTIN TIME PARTIAL: CPT | Performed by: INTERNAL MEDICINE

## 2023-10-16 PROCEDURE — 85027 COMPLETE CBC AUTOMATED: CPT | Performed by: INTERNAL MEDICINE

## 2023-10-16 PROCEDURE — 250N000011 HC RX IP 250 OP 636: Mod: JZ | Performed by: INTERNAL MEDICINE

## 2023-10-16 PROCEDURE — 85018 HEMOGLOBIN: CPT

## 2023-10-16 PROCEDURE — 36415 COLL VENOUS BLD VENIPUNCTURE: CPT | Performed by: STUDENT IN AN ORGANIZED HEALTH CARE EDUCATION/TRAINING PROGRAM

## 2023-10-16 PROCEDURE — 82810 BLOOD GASES O2 SAT ONLY: CPT

## 2023-10-16 PROCEDURE — 272N000001 HC OR GENERAL SUPPLY STERILE: Performed by: INTERNAL MEDICINE

## 2023-10-16 PROCEDURE — 84132 ASSAY OF SERUM POTASSIUM: CPT | Mod: 91 | Performed by: STUDENT IN AN ORGANIZED HEALTH CARE EDUCATION/TRAINING PROGRAM

## 2023-10-16 PROCEDURE — 93451 RIGHT HEART CATH: CPT | Mod: 26 | Performed by: INTERNAL MEDICINE

## 2023-10-16 PROCEDURE — 93568 NJX CAR CTH NSLC P-ART ANGRP: CPT | Performed by: INTERNAL MEDICINE

## 2023-10-16 PROCEDURE — 85610 PROTHROMBIN TIME: CPT | Performed by: INTERNAL MEDICINE

## 2023-10-16 PROCEDURE — 83880 ASSAY OF NATRIURETIC PEPTIDE: CPT | Performed by: INTERNAL MEDICINE

## 2023-10-16 PROCEDURE — 36415 COLL VENOUS BLD VENIPUNCTURE: CPT | Performed by: INTERNAL MEDICINE

## 2023-10-16 PROCEDURE — 999N000132 HC STATISTIC PP CARE STAGE 1

## 2023-10-16 PROCEDURE — 93451 RIGHT HEART CATH: CPT | Performed by: INTERNAL MEDICINE

## 2023-10-16 PROCEDURE — 80053 COMPREHEN METABOLIC PANEL: CPT | Performed by: INTERNAL MEDICINE

## 2023-10-16 RX ORDER — SODIUM CHLORIDE 9 MG/ML
INJECTION, SOLUTION INTRAVENOUS CONTINUOUS
Status: DISCONTINUED | OUTPATIENT
Start: 2023-10-16 | End: 2023-10-16 | Stop reason: HOSPADM

## 2023-10-16 RX ORDER — POTASSIUM CHLORIDE 750 MG/1
20 TABLET, EXTENDED RELEASE ORAL
Status: DISCONTINUED | OUTPATIENT
Start: 2023-10-16 | End: 2023-10-16

## 2023-10-16 RX ORDER — LIDOCAINE 40 MG/G
CREAM TOPICAL
Status: DISCONTINUED | OUTPATIENT
Start: 2023-10-16 | End: 2023-10-16 | Stop reason: HOSPADM

## 2023-10-16 RX ORDER — LIDOCAINE 40 MG/G
CREAM TOPICAL
Status: CANCELLED | OUTPATIENT
Start: 2023-10-16

## 2023-10-16 RX ORDER — POTASSIUM CHLORIDE 750 MG/1
40 TABLET, EXTENDED RELEASE ORAL
Status: DISCONTINUED | OUTPATIENT
Start: 2023-10-16 | End: 2023-10-16

## 2023-10-16 RX ORDER — IOPAMIDOL 755 MG/ML
INJECTION, SOLUTION INTRAVASCULAR
Status: DISCONTINUED | OUTPATIENT
Start: 2023-10-16 | End: 2023-10-16 | Stop reason: HOSPADM

## 2023-10-16 RX ADMIN — LIDOCAINE: 40 CREAM TOPICAL at 11:10

## 2023-10-16 ASSESSMENT — ACTIVITIES OF DAILY LIVING (ADL)
ADLS_ACUITY_SCORE: 35

## 2023-10-16 NOTE — PROVIDER NOTIFICATION
Paged [phone] and [phone]:    Pt's K came back at 4.9. Do you want to move forward with their procedure?

## 2023-10-16 NOTE — PROGRESS NOTES
[facility]  Cardiac Function Monitoring  Subjects scheduled for right heart catheterization (RHC) will undergo measurement with the Cardiac Performance System (CPS) non-invasive device for a brief period before the RHC procedure.      IRB: [id number]  PI: Kwabena Gutierres MD, PhD - Phone: [phone]  Research Nurse Coordinator: Lilliana Guerra RN - Phone: [phone] Pager: [phone]  : Selina Terrazas, CRC - Phone: [phone]; Doreen Estrada, CRC - Phone: [phone]; Katieconnie Ruelas, CRC - Phone: [phone]; Nupur Penny, [phone]     Patient was approached for possible participation for the above study. Inclusion and exclusion criteria reviewed. Patient meets all of the inclusion criteria and does not meet any of the exclusion criteria. The current approved IRB consent form was discussed and explained to the patient.  It was discussed that involvement with the study is voluntary and refusal to participate would not involve penalty or decrease benefits at which the patient is entitled, and the subject may discontinue their involvement at any time without penalty or loss in benefits. The consent form was reviewed including purpose, research hypothesis, nature of the research, risk & benefits. Also reviewed were confidentiality issues, compensation for injury, and alternative procedures available. The patient was given time to review and ask any questions about the consent. Patient was shown contact information for PI and study staff in consent for future questions. Patient verbalized understanding of consent and study by restating the purpose, procedures, duration, risk, confidentiality of PHI, and voluntarily participation. Questions and concerns were addressed. Patient printed, signed and dated the consent/HIPAA form prior to study involvement. A copy was given to the patient for their records.         Subject Consent/HIPAA: SIGNED ON 16 Oct 2023     Selina Terrazas

## 2023-10-16 NOTE — PROGRESS NOTES
Pt was prepped for R heart cath/pulmonary angiogram. K+ recheck sent. Procedure explained. Daughter and granddaughter at bedside and will stay until end of procedure.

## 2023-10-16 NOTE — Clinical Note
dry, intact, no bleeding and no hematoma. 7fr sheath pulled from RIJ. Manual pressure applied. Hemostasis achieved. Occlusive dressing in place.

## 2023-10-16 NOTE — DISCHARGE INSTRUCTIONS
[facility]                        Interventional Cardiology  Discharge Instructions   Post Right Heart Cath and/or Heart Biopsy      AFTER YOU GO HOME:  DO drink plenty of fluids  DO resume your regular diet and medications unless otherwise instructed by your Primary Physician  Do Not scrub the procedure site vigorously  No lotion or powder to the puncture site for 3 days    CALL YOUR PRIMARY PHYSICIAN IF: You may resume all normal activity.  Monitor neck site for bleeding, swelling, or voice changes. If you notice bleeding or swelling immediately apply pressure to the site and call number below to speak with Cardiology Fellow.  If you experience any changes in your breathing you should call your doctor immediately or come to the closest Emergency Department.  Do not drive yourself.    ADDITIONAL INSTRUCTIONS: Medications: You are to resume all home medications including anticoagulation therapy unless otherwise advised by your primary cardiologist or nurse coordinator.    Follow Up: Per your primary cardiology team    If you have any questions or concerns regarding your procedure site please call [phone] at anytime and ask for Cardiology Fellow on call.  They are available 24 hours a day.  You may also contact the Cardiology Clinic after hours number at [phone].                                                       Telephone Numbers [phone] Monday-Friday 8:00 am to 4:30 pm    [phone] After 4:30 pm Monday-Friday, Weekends & Holidays  Ask for Interventional Cardiologist on call. Someone is on call 24 hours/day   [facility] toll free number [phone] Monday-Friday 8:00 am to 4:30 pm   [facility] Emergency Dept [phone]

## 2023-10-16 NOTE — PROGRESS NOTES
S/p RHC. Right neck site intact. Denies pain. Eating and drinking.  Reviewed discharge instructions and signed papers.

## 2023-10-16 NOTE — Clinical Note
Prepped: right neck. Prepped with: ChloraPrep. The patient was draped. .Pre-procedure site marking:N/A all other ROS negative except as per HPI

## 2023-10-23 ENCOUNTER — TELEPHONE (OUTPATIENT)
Dept: CARDIOLOGY | Facility: CLINIC | Age: 73
End: 2023-10-23
Payer: COMMERCIAL

## 2023-10-23 NOTE — TELEPHONE ENCOUNTER
[facility] Call Center    Phone Message    May a detailed message be left on voicemail: yes     Reason for Call: Other: Daughter Chloe calling she was supposed to have a follow up for her mother today and she realized the appt is not covered by ins. She called [place] where she is normally seen and they could not get her in until 1/2024. The schedulers in [place] recommended leaving a message at our clinic to see if there is anything we could help with. Please call the daughter to discuss.      Action Taken: Other: cardiology    Travel Screening: Not Applicable  Thank you!  [facility]             -----------------------------------------------------------  Scheduled patient for follow up appt in November at [facility] with Dr. Tejeda. Sofie Rosen RN on 10/23/2023 at 2:13 PM

## 2023-11-07 ENCOUNTER — TELEPHONE (OUTPATIENT)
Dept: CARDIOLOGY | Facility: CLINIC | Age: 73
End: 2023-11-07
Payer: COMMERCIAL

## 2023-11-07 DIAGNOSIS — R06.02 SOB (SHORTNESS OF BREATH): ICD-10-CM

## 2023-11-07 DIAGNOSIS — I27.20 PULMONARY HYPERTENSION (H): ICD-10-CM

## 2023-11-07 NOTE — TELEPHONE ENCOUNTER
Patient was seen by Dr. Tejeda at [facility] on 11/7/2023.    Plan:  6 month follow up in [place] w/labs prior    Additional Action needed:  None    [facility] scheduling needs:   N/a      Plan was reviewed with the patient at time of the visit.  Patient verbalized understanding, agreed with plan and denied any further questions. Sofie Rosen RN on 11/7/2023 at 4:32 PM    
no fever/no chills

## 2023-11-08 NOTE — TELEPHONE ENCOUNTER
Patient was seen by Dr. Tejeda at [facility] on 11/7/2023.    Plan:  6 month follow up in [place] with labs & 6mwt    Additional Action needed:  N/A    [place] scheduling needs:   None      Plan was reviewed with the patient at time of the visit.  Patient verbalized understanding, agreed with plan and denied any further questions. Sofie Rosen RN on 11/7/2023 at 6:27 PM

## 2023-11-24 ENCOUNTER — TELEPHONE (OUTPATIENT)
Dept: CARDIOLOGY | Facility: CLINIC | Age: 73
End: 2023-11-24
Payer: COMMERCIAL

## 2023-11-24 NOTE — TELEPHONE ENCOUNTER
11/30/2023  @ 12:23 PM -   SUBMITTED [id number] - on:  11/30/23. Rose Mary GALINDO CMA- Prior Auths  Cardiology/Pulmonary Hypertension       11/24/2023  @ 11:37 AM -  Tyvaso DPI - maint 64 mcg - renew - expires: 12/31/23     PA Initiation  Medication: Tyvaso DPI - maint 64 mcg - renew - expires: 12/31/23   Insurance Company: [employer] - Phone [phone] Fax [phone]  Pharmacy Filling the Rx: [facility] [place] - [address] [place] [address]  Filling Pharmacy Phone:    Filling Pharmacy Fax:    Start Date: 11/24/2023  via [facility], key [id number], w/ [facility] dated : 10/16/23; Dx Code: I27.2 OOP to ILD.         Please provide rationale for why the patient cannot take any of the formulary alternatives (must provide rationale for all of the formulary alternatives listed. If no clinical reason is given for each formulary drug, the understanding is that none exists):    Pt has pulmonary hypertension I27.2 secondary to ILD; Tyvaso DPI is the only FDA approved treatment for Ph ILD  ---------------------------  Insurance: [employer]IC[employer]   BIN: [id number]  PCN: [id number]  RX GRP#: *  ID#: [id number]          Rose Mary GALINDO CMA- Prior Auths  Cardiology/Pulmonary Hypertension

## 2023-12-04 NOTE — TELEPHONE ENCOUNTER
12/4/2023  @ 3:23 PM -  Tyvaso DPI - maint 64 mcg - renew - expires: 12/31/23     -  Approved on November 30  PA Case: [id number], Status: Approved, Coverage Starts on: 1/1/2023 12:00:00 AM, Coverage Ends on: 12/31/2024 12:00:00 AM. Questions? Contact [phone].  Authorization Expiration Date: 12/30/2024    Prior Authorization Approval    Medication: TYVASO DPI MAINTENANCE KIT 64 MCG IN POWD  Authorization Effective Date: 1/1/2023  Authorization Expiration Date: 12/30/2024  Approved Dose/Quantity: 120/28/  Reference #: PA Case: [id number]   Insurance Company: [employer] [phone] Fax [phone]  Expected CoPay: $    CoPay Card Available:      Financial Assistance Needed: *  Which Pharmacy is filling the prescription: [facility] [address]  Pharmacy Notified: y  Patient Notified: y    Updated Snapshot, added to PA Calendar; faxed to : [facility]:  [phone]        Rose Mary GALINDO CMA- Prior Auths  Cardiology/Pulmonary Hypertension

## 2023-12-16 NOTE — TELEPHONE ENCOUNTER
12/16/2023  @ 9:04 AM -  faxed [employer] prov form : [per our records, pt has [employer] approval on file for 1 yr that expires:  6/30/24, resent anyway]        Rose Mary GALINDO CMA- Prior Auths  Cardiology/Pulmonary Hypertension

## 2024-06-26 ENCOUNTER — TELEPHONE (OUTPATIENT)
Dept: CARDIOLOGY | Facility: CLINIC | Age: 74
End: 2024-06-26
Payer: COMMERCIAL

## 2024-06-26 NOTE — TELEPHONE ENCOUNTER
"6/26/2024  @ 3:58 PM -  Rcvd email  from PHNurse:  Sofie Rosen RN on 6/26/2024 -  \" We are stopping the Tyvaso DPI for Azul Montiel  Legal: Azul Montiel  Female, 74 year old, 1950  Pt doesn't feel it has been helpful.   Thanks,  Sofie Rosen RN, BSN, PHN  Nurse Care Coordinator\"    Rose Mary GALINDO CMA- Prior Auths  Cardiology/Pulmonary Hypertension     "

## 2024-06-26 NOTE — TELEPHONE ENCOUNTER
Patient was [facility] at [facility] on 6/26/[facility]24.    Plan:  Stop Tyvaso  Follow up in 1 year in [place] with Echo & labs    Additional Action needed:  Contact SP and advise them of medication stop    [place] scheduling needs:   None      Plan was reviewed with the patient at time of the visit.  Patient verbalized understanding, agreed with plan and denied any further questions. Sofie Rosen RN on 6/26/2024 at 3:19 PM

## 2024-06-26 NOTE — TELEPHONE ENCOUNTER
Sent email to [employer] [facility] advising of the Tyvaso med stop. Sofie Rosen RN on 6/26/2024 at 3:28 PM

## 2025-04-27 ENCOUNTER — HEALTH MAINTENANCE LETTER (OUTPATIENT)
Age: 75
End: 2025-04-27

## 2025-08-31 ENCOUNTER — HEALTH MAINTENANCE LETTER (OUTPATIENT)
Age: 75
End: 2025-08-31

## (undated) DEVICE — CATH ANGIO INFINITI PIGTAIL 145 6 SH 6FRX110CM  534-652S

## (undated) DEVICE — KIT HAND CONTROL ACIST 016795

## (undated) DEVICE — INTRO SHEATH 7FRX10CM PINNACLE RSS702

## (undated) DEVICE — PACK HEART RIGHT CUSTOM SAN32RHF18

## (undated) DEVICE — WIRE GUIDE 0.035"X150CM EMERALD J TIP 502521

## (undated) DEVICE — Device

## (undated) DEVICE — MANIFOLD KIT ANGIO AUTOMATED 014613

## (undated) DEVICE — TUBING PRESSURE 30"

## (undated) RX ORDER — LIDOCAINE 40 MG/G
CREAM TOPICAL
Status: DISPENSED
Start: 2023-10-16